# Patient Record
Sex: MALE | Race: WHITE | NOT HISPANIC OR LATINO | URBAN - METROPOLITAN AREA
[De-identification: names, ages, dates, MRNs, and addresses within clinical notes are randomized per-mention and may not be internally consistent; named-entity substitution may affect disease eponyms.]

---

## 2019-05-01 ENCOUNTER — OUTPATIENT (OUTPATIENT)
Dept: OUTPATIENT SERVICES | Facility: HOSPITAL | Age: 19
LOS: 1 days | End: 2019-05-01
Payer: COMMERCIAL

## 2019-05-01 DIAGNOSIS — N13.739: ICD-10-CM

## 2019-05-01 DIAGNOSIS — R10.9 UNSPECIFIED ABDOMINAL PAIN: ICD-10-CM

## 2019-05-01 DIAGNOSIS — K40.90 UNILATERAL INGUINAL HERNIA, WITHOUT OBSTRUCTION OR GANGRENE, NOT SPECIFIED AS RECURRENT: Chronic | ICD-10-CM

## 2019-05-01 DIAGNOSIS — Q62.0 CONGENITAL HYDRONEPHROSIS: Chronic | ICD-10-CM

## 2019-05-01 PROCEDURE — 76770 US EXAM ABDO BACK WALL COMP: CPT

## 2019-05-01 PROCEDURE — 76770 US EXAM ABDO BACK WALL COMP: CPT | Mod: 26

## 2020-08-09 ENCOUNTER — EMERGENCY (EMERGENCY)
Facility: HOSPITAL | Age: 20
LOS: 1 days | Discharge: HOME | End: 2020-08-09
Attending: EMERGENCY MEDICINE | Admitting: EMERGENCY MEDICINE
Payer: COMMERCIAL

## 2020-08-09 VITALS
SYSTOLIC BLOOD PRESSURE: 150 MMHG | HEART RATE: 69 BPM | TEMPERATURE: 99 F | RESPIRATION RATE: 14 BRPM | HEIGHT: 75 IN | OXYGEN SATURATION: 99 % | DIASTOLIC BLOOD PRESSURE: 89 MMHG | WEIGHT: 154.98 LBS

## 2020-08-09 DIAGNOSIS — Q62.0 CONGENITAL HYDRONEPHROSIS: Chronic | ICD-10-CM

## 2020-08-09 DIAGNOSIS — K40.90 UNILATERAL INGUINAL HERNIA, WITHOUT OBSTRUCTION OR GANGRENE, NOT SPECIFIED AS RECURRENT: Chronic | ICD-10-CM

## 2020-08-09 PROCEDURE — 99283 EMERGENCY DEPT VISIT LOW MDM: CPT

## 2020-08-09 PROCEDURE — 99282 EMERGENCY DEPT VISIT SF MDM: CPT

## 2020-08-09 NOTE — ED PROVIDER NOTE - PATIENT PORTAL LINK FT
You can access the FollowMyHealth Patient Portal offered by [facility] by registering at the following website: [email or web address]. By joining [employer]’s FollowMyHealth portal, you will also be able to view your health information using other applications (apps) compatible with our system.

## 2020-08-09 NOTE — ED PROVIDER NOTE - CARE PROVIDER_API CALL
Amos Oconnor   INTERNAL MEDICINE  [address] [place] [address]  Phone: [phone]  Fax: [phone]  Follow Up Time: 1-3 Days

## 2020-08-09 NOTE — ED PROVIDER NOTE - ATTENDING CONTRIBUTION TO CARE
I have personally performed a face to face diagnostic evaluation on this patient.  I have reviewed the PA note and agree with the history, exam, and plan of care, except as noted.  History and Exam by me shows  lightheadedness x 1 week intermittently associated c decreased po intake.  pt is in nad.  a n ox 3.  lungs cta bl. Heart s1s2, rrr, no murmurs.  Pt is asymptomatic in the  ED.

## 2020-08-09 NOTE — ED PROVIDER NOTE - OBJECTIVE STATEMENT
20 year old male with anxiety presenting with intermittent lightheadedness.  He notes its associated with decrease oral intake. His mom notes hes "upset" about things recently and has lost 15 lbs.  He works all day and remembers to drink water but does not eat often.  He 20 year old male with anxiety presenting with intermittent lightheadedness.  He notes its associated with decrease oral intake. His mom notes hes "upset" about things recently and has lost 15 lbs.  He works all day and remembers to drink water but does not eat often.  He had a full cardiac work up within a year, including exercise stress test that was negative.  he does not have lightheadedness now. Denies chest pain, sob, nausae, vomiting, constipation, diarrhea, headache, blurry vision

## 2020-08-09 NOTE — ED ADULT NURSE NOTE - OBJECTIVE STATEMENT
Pt. received alert and oriented x3 with chief complaint of lightheadedness for last week with decreased appetite. Pt. denies any other symptoms at current time.

## 2020-08-09 NOTE — ED PROVIDER NOTE - NSFOLLOWUPINSTRUCTIONS_ED_ALL_ED_FT
-Follow up with your primary care provider for work up for intermittent lightheadedness   -Discuss with your primary care provider weight loss associated with feeling of sadness  -Return to the ER if symptoms worsen

## 2021-02-11 PROBLEM — F41.9 ANXIETY DISORDER, UNSPECIFIED: Chronic | Status: ACTIVE | Noted: 2020-08-09

## 2021-03-12 ENCOUNTER — OUTPATIENT (OUTPATIENT)
Dept: OUTPATIENT SERVICES | Facility: HOSPITAL | Age: 21
LOS: 1 days | End: 2021-03-12
Payer: COMMERCIAL

## 2021-03-12 ENCOUNTER — APPOINTMENT (OUTPATIENT)
Dept: NUCLEAR MEDICINE | Facility: HOSPITAL | Age: 21
End: 2021-03-12
Payer: COMMERCIAL

## 2021-03-12 DIAGNOSIS — Q62.11 CONGENITAL OCCLUSION OF URETEROPELVIC JUNCTION: ICD-10-CM

## 2021-03-12 DIAGNOSIS — R79.89 OTHER SPECIFIED ABNORMAL FINDINGS OF BLOOD CHEMISTRY: ICD-10-CM

## 2021-03-12 DIAGNOSIS — K40.90 UNILATERAL INGUINAL HERNIA, WITHOUT OBSTRUCTION OR GANGRENE, NOT SPECIFIED AS RECURRENT: Chronic | ICD-10-CM

## 2021-03-12 DIAGNOSIS — Q62.0 CONGENITAL HYDRONEPHROSIS: Chronic | ICD-10-CM

## 2021-03-12 DIAGNOSIS — N13.30 UNSPECIFIED HYDRONEPHROSIS: ICD-10-CM

## 2021-03-12 PROCEDURE — 51702 INSERT TEMP BLADDER CATH: CPT

## 2021-03-12 PROCEDURE — 78708 K FLOW/FUNCT IMAGE W/DRUG: CPT

## 2021-03-12 PROCEDURE — A9562: CPT

## 2021-03-12 PROCEDURE — 78708 K FLOW/FUNCT IMAGE W/DRUG: CPT | Mod: 26

## 2022-07-20 ENCOUNTER — NON-APPOINTMENT (OUTPATIENT)
Age: 22
End: 2022-07-20

## 2022-08-03 ENCOUNTER — EMERGENCY (EMERGENCY)
Facility: HOSPITAL | Age: 22
LOS: 1 days | Discharge: HOME | End: 2022-08-03
Attending: EMERGENCY MEDICINE
Payer: COMMERCIAL

## 2022-08-03 VITALS
HEART RATE: 94 BPM | TEMPERATURE: 100 F | OXYGEN SATURATION: 100 % | DIASTOLIC BLOOD PRESSURE: 82 MMHG | RESPIRATION RATE: 16 BRPM | SYSTOLIC BLOOD PRESSURE: 146 MMHG

## 2022-08-03 VITALS
HEIGHT: 75 IN | OXYGEN SATURATION: 96 % | HEART RATE: 115 BPM | TEMPERATURE: 100 F | SYSTOLIC BLOOD PRESSURE: 133 MMHG | WEIGHT: 175.05 LBS | RESPIRATION RATE: 16 BRPM | DIASTOLIC BLOOD PRESSURE: 75 MMHG

## 2022-08-03 DIAGNOSIS — K40.90 UNILATERAL INGUINAL HERNIA, WITHOUT OBSTRUCTION OR GANGRENE, NOT SPECIFIED AS RECURRENT: Chronic | ICD-10-CM

## 2022-08-03 DIAGNOSIS — Q62.0 CONGENITAL HYDRONEPHROSIS: Chronic | ICD-10-CM

## 2022-08-03 PROCEDURE — 99283 EMERGENCY DEPT VISIT LOW MDM: CPT

## 2022-08-03 RX ORDER — DEXAMETHASONE 0.5 MG/5ML
10 ELIXIR ORAL ONCE
Refills: 0 | Status: COMPLETED | OUTPATIENT
Start: 2022-08-03 | End: 2022-08-03

## 2022-08-03 RX ORDER — ONDANSETRON 8 MG/1
1 TABLET, FILM COATED ORAL
Qty: 15 | Refills: 0
Start: 2022-08-03 | End: 2022-08-07

## 2022-08-03 RX ADMIN — Medication 10 MILLIGRAM(S): at 23:53

## 2022-08-03 NOTE — ED PROVIDER NOTE - NSICDXPASTSURGICALHX_GEN_ALL_CORE_FT
PAST SURGICAL HISTORY:  Congenital hydronephrosis bilateral ureteral reimplant age 6    Hernia, inguinal, unilateral

## 2022-08-03 NOTE — ED PROVIDER NOTE - PATIENT PORTAL LINK FT
You can access the FollowMyHealth Patient Portal offered by [facility] by registering at the following website: [email or web address]. By joining [employer]’s FollowMyHealth portal, you will also be able to view your health information using other applications (apps) compatible with our system.

## 2022-08-03 NOTE — ED PROVIDER NOTE - OBJECTIVE STATEMENT
22 M with PMHx of Congential hydronephrosis, and CKD presents to the ED c/o fever TMAX 102F, and sore throat x3 days. Pt two weeks ago endorsed fever w/ urinary sx to which he went to [place] ED and was dx with UTI. Pt was placed on Macrobid to which he finished the abx course this past Saturday. Pt went to PMD and had full workup including echo, blood work, and had throat cx sent which were negative. On Monday, pt developed sore throat and fever. Went to urgent care and was dx with strep throat. Was placed on Omnicef, however, has been having intermittent fever and difficulty speaking due to throat pain. Called PMD today and was referred to ED for further evaluation.

## 2022-08-03 NOTE — ED PROVIDER NOTE - ADDITIONAL NOTES AND INSTRUCTIONS:
To Whom It May Concern,  Elvin Arellano was seen and treated within our emergency department today. Please excuse them from all work activities until the date indicated to allow time for adequate recovery. Thank you for your understanding.    Del Birmingham MD

## 2022-08-03 NOTE — ED PROVIDER NOTE - CLINICAL SUMMARY MEDICAL DECISION MAKING FREE TEXT BOX
Barak Green (MD): 22 M with recent workup for fever by PMD and at [place] including blood work, and covid swab, found to have small UTI which was tx with macrobid now presenting with recurrent fever this past week. Found to have + strep at urgent care. Pt became febrile this evening. Pt is well appearing. Clinically, pt has no signs of PTA. In light of the fact of source and large workup done recently, discussed with family about supportive care and to continue abx course as it has only been several days. Barak Green (MD): 22 M with recent workup for fever by PMD and at [place] including blood work, and neg covid swab, found to have small UTI which was tx with macrobid now presenting with recurrent fever this past week. Found to have + strep at urgent care. Pt became febrile this evening. Pt is well appearing. Clinically, pt has no signs of PTA. In light of the fact of source and large workup done recently, discussed with family about supportive care and to continue abx course as it has only been several days. vss, looks much better to start prednisone, pmd follow up.

## 2022-08-03 NOTE — ED ADULT NURSE NOTE - OBJECTIVE STATEMENT
22 y.o. M A&Ox4 PMHx of CKD presenting to ED via walk-in from home for sore throat. Pt states that he had been to [place] 3 weeks ago for a fever and had a full work up with negative findings. States that shortly after he developed a sore throat and went to his PCP where a strep culture was done and resulted negative. States that the pain and fever got worse and he went to  where the strep culture came back positive. States that he was put on ABX three days ago and  that he was feeling better but that around 3 hours ago he spiked a 102.5 fever and was told by PCP to go to ER for eval. Pt denies H/A, lightheadedness/dizziness, vision changes, SOB, chest pain, abd pain, N/V/D, constipation, dysuria, hematuria, hematochezia, weakness, numbness, and tingling. Upon assessment, pt alert, grossly neurologically intact, and well appearing. Pupils PERRLA and no drainage noted. Airway patent, chest rise symmetrical with no advantageous L/S, and pt is eupneic. Skin is warm, dry, and normal for race. No cyanosis noted to lips or fingernails. Mucous membranes moist. Negative clubbed fingernails. Radial pulses equal and +2 bilaterally. Abd soft, nontender, nondistended. Normoactive bowel sounds noted to all 4 quadrants. ROM intact and strength +5 in all four extremities. No edema noted to bilateral legs or arms. ED Derzie at bedside for eval. Pt mother at bedside. Pt resting in stretcher in position of comfort. Stretcher locked and lowered and call bell within reach.

## 2022-08-03 NOTE — ED PROVIDER NOTE - NSICDXPASTMEDICALHX_GEN_ALL_CORE_FT
PAST MEDICAL HISTORY:  Anxiety     Congenital hydronephrosis corrected at age 6 by bilateral ureteral reimplantation

## 2022-08-03 NOTE — ED PROVIDER NOTE - NSFOLLOWUPINSTRUCTIONS_ED_ALL_ED_FT
You were seen and evaluated today for fever and sore throat likely related to your strep pharyngitis  - You were given a one time does of steroids   - Follow up with your primary care physician within 7-10 days  - You were prescribed ondansetron for nausea. Please take only as needed and as directed on bottle  - Please continue taking all medications as prescribed    Please return to the Emergency Department should you experience any of the following:  - Persistent headache, lightheadedness, or dizziness  - New or  worsening nausea or vomiting  - Numbness or weakness of your arms or legs  - Fever, unexplained weight loss, night sweats  - Fainting or excessive fatigue  - Any new concerning symptoms

## 2022-10-30 ENCOUNTER — NON-APPOINTMENT (OUTPATIENT)
Age: 22
End: 2022-10-30

## 2023-05-24 ENCOUNTER — TRANSCRIPTION ENCOUNTER (OUTPATIENT)
Age: 23
End: 2023-05-24

## 2023-06-21 PROBLEM — Z00.00 ENCOUNTER FOR PREVENTIVE HEALTH EXAMINATION: Status: ACTIVE | Noted: 2023-06-21

## 2023-07-06 ENCOUNTER — APPOINTMENT (OUTPATIENT)
Dept: ORTHOPEDIC SURGERY | Facility: CLINIC | Age: 23
End: 2023-07-06

## 2023-07-27 ENCOUNTER — TRANSCRIPTION ENCOUNTER (OUTPATIENT)
Age: 23
End: 2023-07-27

## 2023-08-22 ENCOUNTER — TRANSCRIPTION ENCOUNTER (OUTPATIENT)
Age: 23
End: 2023-08-22

## 2023-11-15 NOTE — ED ADULT TRIAGE NOTE - BSA (M2)
Take zyrtec 2.5ml every day  Try flonase once a day or maybe even twice a day until you see the ENT                Constipation is an all-too-common problem among kids and a top source of belly pain. There are several culprits, including \"withholding\" (resisting the urge to go) and not getting enough physical activity. But diet plays a big role too. Unfortunately, the stereotypical American diet--high in processed food and fast food and woefully low in fruits and vegetables--makes matters worse.    If you're looking for a natural way to keep your child regular, fiber is your friend! That's because fiber makes stools softer and bulkier, so they're easier to pass. Be sure your child is also drinking plenty of water, which naturally softens stools too. If your child's diet is low in fiber now, remember to add these foods slowly (and in small portions at first)--since a lot of fiber at once can trigger gas and bloating.    It's recommended that kids get about 20 grams of fiber per day. If your child is eating plenty of fruits, vegetables, and whole grains, it adds up fast--especially when you serve these 11 fiber-rich, kid-friendly foods with meals or snacks:    1. Raspberries, Strawberries, Blueberries  With close to eight grams of fiber per cup, berries are one of the highest-fiber fruits. Whether they're fresh or frozen, you'll still get the benefits.  2. Potatoes/Sweet Potatoes  Skip fries in favor of baked, boiled, or steamed potatoes. One medium spud has nearly four grams of fiber (keep the skin on for a small fiber boost). Sweet potatoes have an even higher amount of fiber.   3. Edamame  A quarter cup of shelled edamame has three grams of fiber, and kids love popping the nutty-tasting beans out of the pods and into their mouths.  4. Oatmeal  A packet of plain, instant oatmeal has 4 grams of fiber. Top it with sliced fruit for an extra dose of fiber and a little honey or maple syrup for sweetness.  5. Whole-wheat  pasta  There's a whopping six grams of fiber in a serving of whole wheat pasta. If your kids are used to white, mix half white and half whole wheat and call it \"zebra pasta.\"  6. Beans  All beans are a good source of protein, fiber, and iron. Spread refried beans on quesadillas or stir them into your burrito filling. A quarter cup has about three grams of fiber. Remember to keep refried beans to a minimum though as there’s also a lot of fat in them.     7. Flaxseed   Buy ground flaxseed (or grind it yourself) to get the health benefits of these tiny nutrition powerhouses. Flax boasts healthy fats and about three grams of fiber per tablespoon. You can add flaxseed to muffin and waffle batter and blend it into smoothies.  8. Avocado  A quarter cup of mashed avocado has three grams of fiber, plus heart-healthy monounsaturated fats. Add a squeeze of fresh lime juice and some salt for an easy guacamole to serve with chips, or spread some on whole-grain toast.  9. Plain Popcorn (without butter)  Serve this crunchy kid favorite at snack time--it's actually a whole grain.   10. Pears/Peaches  One medium piece of fruit (with the skin on) has five grams of fiber--that's a quarter of what kids need each day!  11. Pomegranate seeds  These sweet little seeds have three grams of fiber per half cup. Eat them out of hand or put them on yogurt or oatmeal.  12. Prunes  Pureed (for infants and children 4mo-3yo), Juice (2oz for children >6mo), or whole (for children >3yo) prunes have a very high fiber content. But they also have a lot of sugar so keep these to 1 serving a day.   13. Almonds  [place] milk is a great replacement for constipating cow’s milk for children who are generally growing and eating well. Whole almonds can be a delicious snack for children > 3yo.     Foods that can be very constipating include: Cow’s milk, apples (except for apple juice), bananas, and white breads or pastas. Keep these to a minimum or make sure that  your child gets more of the foods that help prevent constipation on days they eat a lot of these constipating foods.    PATIENT INFORMATION    Anticipatory guidance discussed  Bicycle helmets  Chores and other responsibilities  Discipline issues: limit-setting, positive reinforcement  Importance of regular dental care  Importance of regular exercise  Minimize junk food  Seat belts; don't put in front seat  Skim or lowfat milk best  Teach child how to deal with strangers  Teaching pedestrian safety    Follow-Up  - Return for your yearly well child visit.    7-8 years old Health and Safety Tips - The following hyperlinks are available to access via [employer]    Parent Education from Healthy Parent    Educación para padres sobre niños sanos    Additional Educational Resources:  For additional resources regarding your symptoms, diagnosis, or further health information, please visit the Discover a Healthier You section on [email or web address] or the Online Health Resources section in [employer].   2.07

## 2024-01-12 NOTE — ED PROVIDER NOTE - CPE EDP NEURO NORM
Consent: Jeni Bautista, who was seen by synchronous (real-time) audio-video technology, and/or her healthcare decision maker, is aware that this patient-initiated, Telehealth encounter on 1/12/2024 is a billable service, with coverage as determined by her insurance carrier. She is aware that she may receive a bill and has provided verbal consent to proceed: YES-Consent obtained within past 12 months        712  Subjective:   Jeni Bautista is a 19 y.o. female who was seen for Follow-up (migraines)  Patient presents for follow-up management of migraines.  Sumatriptan and Ondansetron were ordered at her last visit for acute management. Reports one migraine since last visit on 12/11/23 that lasted several hours. Sumatriptan improved pain when laying flat. Once standing migraine returned. Unaware she could take a second dose.     Prior to Admission medications    Medication Sig Start Date End Date Taking? Authorizing Provider   SUMAtriptan (IMITREX) 100 MG tablet Take 1 tab at migraine onset. If symptoms persist or return, may repeat dose after 2 hours. Maximum dose: 100 mg/dose; 200 mg per 24 hours. 12/8/23  Yes Aimee Jenkins APRN - NP   ondansetron (ZOFRAN) 8 MG tablet Take 1 tablet by mouth every 8 hours as needed for Nausea or Vomiting 12/8/23  Yes Aimee Jenkins APRN - NP     No Known Allergies  No problem-specific Assessment & Plan notes found for this encounter.         Objective:   Vital Signs: (As obtained by patient/caregiver at home)  There were no vitals taken for this visit.     [INSTRUCTIONS:  \"[x]\" Indicates a positive item  \"[]\" Indicates a negative item  -- DELETE ALL ITEMS NOT EXAMINED]    Constitutional: [x] Appears well-developed and well-nourished [x] No apparent distress      [] Abnormal -     Mental status: [x] Alert and awake  [x] Oriented to person/place/time [x] Able to follow commands    [] Abnormal -     Eyes:   EOM    [x]  Normal    [] Abnormal -   Sclera  [x]  Normal    []  normal...

## 2024-04-06 ENCOUNTER — NON-APPOINTMENT (OUTPATIENT)
Age: 24
End: 2024-04-06

## 2024-07-30 ENCOUNTER — NON-APPOINTMENT (OUTPATIENT)
Age: 24
End: 2024-07-30

## 2024-08-09 ENCOUNTER — APPOINTMENT (OUTPATIENT)
Dept: DERMATOLOGY | Facility: CLINIC | Age: 24
End: 2024-08-09

## 2024-09-11 ENCOUNTER — APPOINTMENT (OUTPATIENT)
Dept: DERMATOLOGY | Facility: CLINIC | Age: 24
End: 2024-09-11

## 2024-11-06 ENCOUNTER — EMERGENCY (EMERGENCY)
Facility: HOSPITAL | Age: 24
LOS: 1 days | Discharge: HOME | End: 2024-11-06
Attending: EMERGENCY MEDICINE
Payer: COMMERCIAL

## 2024-11-06 VITALS
RESPIRATION RATE: 16 BRPM | OXYGEN SATURATION: 97 % | DIASTOLIC BLOOD PRESSURE: 75 MMHG | HEART RATE: 79 BPM | SYSTOLIC BLOOD PRESSURE: 125 MMHG | TEMPERATURE: 98 F

## 2024-11-06 VITALS
OXYGEN SATURATION: 96 % | HEART RATE: 88 BPM | RESPIRATION RATE: 16 BRPM | SYSTOLIC BLOOD PRESSURE: 128 MMHG | DIASTOLIC BLOOD PRESSURE: 77 MMHG | WEIGHT: 190.04 LBS | HEIGHT: 75 IN | TEMPERATURE: 98 F

## 2024-11-06 DIAGNOSIS — Q62.0 CONGENITAL HYDRONEPHROSIS: Chronic | ICD-10-CM

## 2024-11-06 DIAGNOSIS — K40.90 UNILATERAL INGUINAL HERNIA, WITHOUT OBSTRUCTION OR GANGRENE, NOT SPECIFIED AS RECURRENT: Chronic | ICD-10-CM

## 2024-11-06 LAB
APPEARANCE UR: CLEAR — SIGNIFICANT CHANGE UP
BACTERIA # UR AUTO: NEGATIVE /HPF — SIGNIFICANT CHANGE UP
BILIRUB UR-MCNC: NEGATIVE — SIGNIFICANT CHANGE UP
CAST: 0 /LPF — SIGNIFICANT CHANGE UP (ref 0–4)
COLOR SPEC: YELLOW — SIGNIFICANT CHANGE UP
DIFF PNL FLD: NEGATIVE — SIGNIFICANT CHANGE UP
GLUCOSE UR QL: NEGATIVE MG/DL — SIGNIFICANT CHANGE UP
KETONES UR-MCNC: NEGATIVE MG/DL — SIGNIFICANT CHANGE UP
LEUKOCYTE ESTERASE UR-ACNC: NEGATIVE — SIGNIFICANT CHANGE UP
NITRITE UR-MCNC: NEGATIVE — SIGNIFICANT CHANGE UP
PH UR: 6.5 — SIGNIFICANT CHANGE UP (ref 5–8)
PROT UR-MCNC: NEGATIVE MG/DL — SIGNIFICANT CHANGE UP
RBC CASTS # UR COMP ASSIST: 1 /HPF — SIGNIFICANT CHANGE UP (ref 0–4)
SP GR SPEC: 1.01 — SIGNIFICANT CHANGE UP (ref 1–1.03)
SQUAMOUS # UR AUTO: 0 /HPF — SIGNIFICANT CHANGE UP (ref 0–5)
UROBILINOGEN FLD QL: 0.2 MG/DL — SIGNIFICANT CHANGE UP (ref 0.2–1)
WBC UR QL: 6 /HPF — HIGH (ref 0–5)

## 2024-11-06 PROCEDURE — 87591 N.GONORRHOEAE DNA AMP PROB: CPT

## 2024-11-06 PROCEDURE — 99284 EMERGENCY DEPT VISIT MOD MDM: CPT | Mod: 25

## 2024-11-06 PROCEDURE — 99284 EMERGENCY DEPT VISIT MOD MDM: CPT

## 2024-11-06 PROCEDURE — 87086 URINE CULTURE/COLONY COUNT: CPT

## 2024-11-06 PROCEDURE — 76870 US EXAM SCROTUM: CPT | Mod: 26

## 2024-11-06 PROCEDURE — 81001 URINALYSIS AUTO W/SCOPE: CPT

## 2024-11-06 PROCEDURE — 93975 VASCULAR STUDY: CPT | Mod: 26

## 2024-11-06 PROCEDURE — 93975 VASCULAR STUDY: CPT

## 2024-11-06 PROCEDURE — 76870 US EXAM SCROTUM: CPT

## 2024-11-06 PROCEDURE — 87491 CHLMYD TRACH DNA AMP PROBE: CPT

## 2024-11-06 RX ORDER — ACETAMINOPHEN 500 MG/5ML
650 LIQUID (ML) ORAL ONCE
Refills: 0 | Status: COMPLETED | OUTPATIENT
Start: 2024-11-06 | End: 2024-11-06

## 2024-11-07 LAB
C TRACH RRNA SPEC QL NAA+PROBE: DETECTED
CULTURE RESULTS: SIGNIFICANT CHANGE UP
N GONORRHOEA RRNA SPEC QL NAA+PROBE: SIGNIFICANT CHANGE UP
SPECIMEN SOURCE: SIGNIFICANT CHANGE UP

## 2024-11-10 RX ORDER — ONDANSETRON HCL/PF 4 MG/2 ML
1 VIAL (ML) INJECTION
Qty: 3 | Refills: 0
Start: 2024-11-10 | End: 2024-11-10

## 2024-11-10 RX ORDER — AZITHROMYCIN 250 MG
2 CAPSULE ORAL
Qty: 2 | Refills: 0
Start: 2024-11-10

## 2025-04-10 ENCOUNTER — EMERGENCY (EMERGENCY)
Facility: HOSPITAL | Age: 25
LOS: 1 days | End: 2025-04-10
Attending: EMERGENCY MEDICINE
Payer: COMMERCIAL

## 2025-04-10 VITALS
OXYGEN SATURATION: 100 % | DIASTOLIC BLOOD PRESSURE: 75 MMHG | SYSTOLIC BLOOD PRESSURE: 157 MMHG | TEMPERATURE: 98 F | HEART RATE: 84 BPM | HEIGHT: 76 IN | RESPIRATION RATE: 18 BRPM | WEIGHT: 184.97 LBS

## 2025-04-10 VITALS
DIASTOLIC BLOOD PRESSURE: 71 MMHG | TEMPERATURE: 99 F | HEART RATE: 62 BPM | RESPIRATION RATE: 16 BRPM | SYSTOLIC BLOOD PRESSURE: 132 MMHG | OXYGEN SATURATION: 100 %

## 2025-04-10 DIAGNOSIS — K40.90 UNILATERAL INGUINAL HERNIA, WITHOUT OBSTRUCTION OR GANGRENE, NOT SPECIFIED AS RECURRENT: Chronic | ICD-10-CM

## 2025-04-10 DIAGNOSIS — Q62.0 CONGENITAL HYDRONEPHROSIS: Chronic | ICD-10-CM

## 2025-04-10 LAB
ALBUMIN SERPL ELPH-MCNC: 4.9 G/DL — SIGNIFICANT CHANGE UP (ref 3.3–5)
ALP SERPL-CCNC: 79 U/L — SIGNIFICANT CHANGE UP (ref 40–120)
ALT FLD-CCNC: 16 U/L — SIGNIFICANT CHANGE UP (ref 10–45)
ANION GAP SERPL CALC-SCNC: 15 MMOL/L — SIGNIFICANT CHANGE UP (ref 5–17)
AST SERPL-CCNC: 13 U/L — SIGNIFICANT CHANGE UP (ref 10–40)
BASOPHILS # BLD AUTO: 0.04 K/UL — SIGNIFICANT CHANGE UP (ref 0–0.2)
BASOPHILS NFR BLD AUTO: 0.4 % — SIGNIFICANT CHANGE UP (ref 0–2)
BILIRUB SERPL-MCNC: 0.6 MG/DL — SIGNIFICANT CHANGE UP (ref 0.2–1.2)
BUN SERPL-MCNC: 19 MG/DL — SIGNIFICANT CHANGE UP (ref 7–23)
CALCIUM SERPL-MCNC: 10.1 MG/DL — SIGNIFICANT CHANGE UP (ref 8.4–10.5)
CHLORIDE SERPL-SCNC: 100 MMOL/L — SIGNIFICANT CHANGE UP (ref 96–108)
CO2 SERPL-SCNC: 27 MMOL/L — SIGNIFICANT CHANGE UP (ref 22–31)
CREAT SERPL-MCNC: 1.51 MG/DL — HIGH (ref 0.5–1.3)
CRP SERPL-MCNC: <3 MG/L — SIGNIFICANT CHANGE UP (ref 0–4)
EGFR: 65 ML/MIN/1.73M2 — SIGNIFICANT CHANGE UP
EGFR: 65 ML/MIN/1.73M2 — SIGNIFICANT CHANGE UP
EOSINOPHIL # BLD AUTO: 0.05 K/UL — SIGNIFICANT CHANGE UP (ref 0–0.5)
EOSINOPHIL NFR BLD AUTO: 0.5 % — SIGNIFICANT CHANGE UP (ref 0–6)
GLUCOSE SERPL-MCNC: 82 MG/DL — SIGNIFICANT CHANGE UP (ref 70–99)
HCT VFR BLD CALC: 50.3 % — HIGH (ref 39–50)
HGB BLD-MCNC: 17.3 G/DL — HIGH (ref 13–17)
IMM GRANULOCYTES NFR BLD AUTO: 0.2 % — SIGNIFICANT CHANGE UP (ref 0–0.9)
LYMPHOCYTES # BLD AUTO: 1.54 K/UL — SIGNIFICANT CHANGE UP (ref 1–3.3)
LYMPHOCYTES # BLD AUTO: 15.4 % — SIGNIFICANT CHANGE UP (ref 13–44)
MCHC RBC-ENTMCNC: 30.2 PG — SIGNIFICANT CHANGE UP (ref 27–34)
MCHC RBC-ENTMCNC: 34.4 G/DL — SIGNIFICANT CHANGE UP (ref 32–36)
MCV RBC AUTO: 87.9 FL — SIGNIFICANT CHANGE UP (ref 80–100)
MONOCYTES # BLD AUTO: 0.78 K/UL — SIGNIFICANT CHANGE UP (ref 0–0.9)
MONOCYTES NFR BLD AUTO: 7.8 % — SIGNIFICANT CHANGE UP (ref 2–14)
NEUTROPHILS # BLD AUTO: 7.56 K/UL — HIGH (ref 1.8–7.4)
NEUTROPHILS NFR BLD AUTO: 75.7 % — SIGNIFICANT CHANGE UP (ref 43–77)
NRBC BLD AUTO-RTO: 0 /100 WBCS — SIGNIFICANT CHANGE UP (ref 0–0)
NT-PROBNP SERPL-SCNC: <36 PG/ML — SIGNIFICANT CHANGE UP (ref 0–300)
PLATELET # BLD AUTO: 291 K/UL — SIGNIFICANT CHANGE UP (ref 150–400)
POTASSIUM SERPL-MCNC: 3.9 MMOL/L — SIGNIFICANT CHANGE UP (ref 3.5–5.3)
POTASSIUM SERPL-SCNC: 3.9 MMOL/L — SIGNIFICANT CHANGE UP (ref 3.5–5.3)
PROT SERPL-MCNC: 7.7 G/DL — SIGNIFICANT CHANGE UP (ref 6–8.3)
RBC # BLD: 5.72 M/UL — SIGNIFICANT CHANGE UP (ref 4.2–5.8)
RBC # FLD: 12.4 % — SIGNIFICANT CHANGE UP (ref 10.3–14.5)
SODIUM SERPL-SCNC: 142 MMOL/L — SIGNIFICANT CHANGE UP (ref 135–145)
TROPONIN T, HIGH SENSITIVITY RESULT: 7 NG/L — SIGNIFICANT CHANGE UP (ref 0–51)
TSH SERPL-MCNC: 0.72 UIU/ML — SIGNIFICANT CHANGE UP (ref 0.27–4.2)
WBC # BLD: 9.99 K/UL — SIGNIFICANT CHANGE UP (ref 3.8–10.5)
WBC # FLD AUTO: 9.99 K/UL — SIGNIFICANT CHANGE UP (ref 3.8–10.5)

## 2025-04-10 PROCEDURE — 85025 COMPLETE CBC W/AUTO DIFF WBC: CPT

## 2025-04-10 PROCEDURE — 99285 EMERGENCY DEPT VISIT HI MDM: CPT | Mod: 25

## 2025-04-10 PROCEDURE — 84443 ASSAY THYROID STIM HORMONE: CPT

## 2025-04-10 PROCEDURE — 83880 ASSAY OF NATRIURETIC PEPTIDE: CPT

## 2025-04-10 PROCEDURE — 71046 X-RAY EXAM CHEST 2 VIEWS: CPT | Mod: 26

## 2025-04-10 PROCEDURE — 86140 C-REACTIVE PROTEIN: CPT

## 2025-04-10 PROCEDURE — 99285 EMERGENCY DEPT VISIT HI MDM: CPT

## 2025-04-10 PROCEDURE — 71046 X-RAY EXAM CHEST 2 VIEWS: CPT

## 2025-04-10 PROCEDURE — 93010 ELECTROCARDIOGRAM REPORT: CPT

## 2025-04-10 PROCEDURE — 84484 ASSAY OF TROPONIN QUANT: CPT

## 2025-04-10 PROCEDURE — 85652 RBC SED RATE AUTOMATED: CPT

## 2025-04-10 PROCEDURE — 80053 COMPREHEN METABOLIC PANEL: CPT

## 2025-04-10 PROCEDURE — 36000 PLACE NEEDLE IN VEIN: CPT

## 2025-04-10 PROCEDURE — 93005 ELECTROCARDIOGRAM TRACING: CPT

## 2025-04-10 NOTE — ED PROVIDER NOTE - CLINICAL SUMMARY MEDICAL DECISION MAKING FREE TEXT BOX
Concern for cardiac etiology of the pain however given young age clear chest x-ray tool out pulmonary sources such as pneumothorax as well as screening labs given his history of CKD if workup is negative likely GERD versus anxiety referred the patient to see a cardiologist whom he saw about 10 years ago for murmur and per his report had stress test that was negative at the time also recommended seeing a psychologist does have a psychiatrist

## 2025-04-10 NOTE — ED PROVIDER NOTE - OBJECTIVE STATEMENT
25-year-old gentleman with past medical history history significant for congenital vesicoureteral reflux and CKD presenting with several weeks of intermittent chest pain describes the pain as feeling of impending doom going into his belly but no shortness of breath happens at all times not associated with position is nonexertional nonpleuritic denies any recent illness no fever chills no cough no sore throat denies any nausea vomiting abdominal pain still have any headaches or any other symptoms and no history of VTE and no leg swelling  Occasional alcohol user he is using almost daily marijuana no history of psych history  He follows with a urologist annually for his kidney problem and was told that he does not need any procedures as of now

## 2025-04-10 NOTE — ED PROVIDER NOTE - PATIENT PORTAL LINK FT
You can access the FollowMyHealth Patient Portal offered by [facility] by registering at the following website: [email or web address]. By joining [employer]’s FollowMyHealth portal, you will also be able to view your health information using other applications (apps) compatible with our system.

## 2025-04-10 NOTE — ED PROVIDER NOTE - NSFOLLOWUPCLINICS_GEN_ALL_ED_FT
Cardiology at [facility]  Cardiology  [address] [place] [address]  Phone: [phone]  Fax:     Cardiology at [facility]  Cardiology  [address] [place] [address]  Phone: [phone]  Fax:     [facility] Substance [facility]  Psychiatry Substance Abuse  [address], [place] [address]  Phone: [phone]  Fax:

## 2025-04-10 NOTE — ED ADULT NURSE NOTE - OBJECTIVE STATEMENT
25-year-old gentleman with past medical history significant for congenital vesicoureteral reflux and CKD presenting with several weeks of intermittent chest pain describes the pain as feeling of impending doom going into his belly but no shortness of breath happens at all times not associated with position is nonexertional nonpleuritic denies any recent illness no fever chills no cough no sore throat denies any nausea vomiting abdominal pain still have any headaches or any other symptoms

## 2025-04-10 NOTE — ED PROVIDER NOTE - PHYSICAL EXAMINATION
, Comfortable well-appearing nondepressed mood neuro intact clear lungs S1-S2 no carotid bruits soft nontender abdomen no organomegaly no midline tenderness over the spine no peripheral edema

## 2025-04-10 NOTE — ED PROVIDER NOTE - NSFOLLOWUPINSTRUCTIONS_ED_ALL_ED_FT
Please make an appointment with your cardiologist, or alternatively, see a new cardiologist as per our referral.   Please make an appointment with psychiatrist if you continue to have symptoms.   Please consider reducing the use of THC.

## 2025-04-11 LAB — ERYTHROCYTE [SEDIMENTATION RATE] IN BLOOD: 2 MM/HR — SIGNIFICANT CHANGE UP (ref 0–15)

## 2025-07-15 ENCOUNTER — TRANSCRIPTION ENCOUNTER (OUTPATIENT)
Age: 25
End: 2025-07-15

## 2025-08-22 ENCOUNTER — TRANSCRIPTION ENCOUNTER (OUTPATIENT)
Age: 25
End: 2025-08-22

## 2025-08-26 ENCOUNTER — TRANSCRIPTION ENCOUNTER (OUTPATIENT)
Age: 25
End: 2025-08-26

## 2025-09-16 ENCOUNTER — TRANSCRIPTION ENCOUNTER (OUTPATIENT)
Age: 25
End: 2025-09-16